# Patient Record
Sex: FEMALE | Race: BLACK OR AFRICAN AMERICAN
[De-identification: names, ages, dates, MRNs, and addresses within clinical notes are randomized per-mention and may not be internally consistent; named-entity substitution may affect disease eponyms.]

---

## 2018-08-20 ENCOUNTER — HOSPITAL ENCOUNTER (INPATIENT)
Dept: HOSPITAL 74 - JDEL | Age: 33
LOS: 3 days | Discharge: HOME | End: 2018-08-23
Attending: OBSTETRICS & GYNECOLOGY | Admitting: OBSTETRICS & GYNECOLOGY
Payer: COMMERCIAL

## 2018-08-20 VITALS — BODY MASS INDEX: 45.3 KG/M2

## 2018-08-20 DIAGNOSIS — M51.26: ICD-10-CM

## 2018-08-20 DIAGNOSIS — Z3A.39: ICD-10-CM

## 2018-08-20 DIAGNOSIS — O98.313: ICD-10-CM

## 2018-08-20 DIAGNOSIS — O99.213: ICD-10-CM

## 2018-08-20 DIAGNOSIS — E66.01: ICD-10-CM

## 2018-08-20 DIAGNOSIS — A60.09: ICD-10-CM

## 2018-08-20 LAB
ANION GAP SERPL CALC-SCNC: 8 MMOL/L (ref 8–16)
APTT BLD: 27.3 SECONDS (ref 25.2–36.5)
BASOPHILS # BLD: 0.3 % (ref 0–2)
BUN SERPL-MCNC: 8 MG/DL (ref 7–18)
CALCIUM SERPL-MCNC: 8.5 MG/DL (ref 8.5–10.1)
CHLORIDE SERPL-SCNC: 104 MMOL/L (ref 98–107)
CO2 SERPL-SCNC: 26 MMOL/L (ref 21–32)
CREAT SERPL-MCNC: 0.7 MG/DL (ref 0.55–1.02)
DEPRECATED RDW RBC AUTO: 14.3 % (ref 11.6–15.6)
EOSINOPHIL # BLD: 0.3 % (ref 0–4.5)
GLUCOSE SERPL-MCNC: 91 MG/DL (ref 74–106)
HCT VFR BLD CALC: 36.9 % (ref 32.4–45.2)
HGB BLD-MCNC: 12.5 GM/DL (ref 10.7–15.3)
INR BLD: 1.01 (ref 0.83–1.09)
LYMPHOCYTES # BLD: 11.1 % (ref 8–40)
MCH RBC QN AUTO: 30.5 PG (ref 25.7–33.7)
MCHC RBC AUTO-ENTMCNC: 33.9 G/DL (ref 32–36)
MCV RBC: 89.8 FL (ref 80–96)
MONOCYTES # BLD AUTO: 4.7 % (ref 3.8–10.2)
NEUTROPHILS # BLD: 83.6 % (ref 42.8–82.8)
PLATELET # BLD AUTO: 289 K/MM3 (ref 134–434)
PMV BLD: 7.4 FL (ref 7.5–11.1)
POTASSIUM SERPLBLD-SCNC: 3.8 MMOL/L (ref 3.5–5.1)
PT PNL PPP: 11.4 SEC (ref 9.7–13)
RBC # BLD AUTO: 4.11 M/MM3 (ref 3.6–5.2)
SODIUM SERPL-SCNC: 138 MMOL/L (ref 136–145)
WBC # BLD AUTO: 14.5 K/MM3 (ref 4–10)

## 2018-08-20 PROCEDURE — 0HQ9XZZ REPAIR PERINEUM SKIN, EXTERNAL APPROACH: ICD-10-PCS | Performed by: OBSTETRICS & GYNECOLOGY

## 2018-08-20 RX ADMIN — BUPIVACAINE HYDROCHLORIDE SCH ML: 7.5 INJECTION, SOLUTION EPIDURAL; RETROBULBAR at 20:45

## 2018-08-20 RX ADMIN — AMPICILLIN SCH MLS/HR: 1 INJECTION, POWDER, FOR SOLUTION INTRAMUSCULAR; INTRAVENOUS at 17:00

## 2018-08-20 RX ADMIN — AMPICILLIN SCH MLS/HR: 1 INJECTION, POWDER, FOR SOLUTION INTRAMUSCULAR; INTRAVENOUS at 21:00

## 2018-08-20 RX ADMIN — SODIUM CHLORIDE, SODIUM GLUCONATE, SODIUM ACETATE, POTASSIUM CHLORIDE, AND MAGNESIUM CHLORIDE SCH MLS/HR: 526; 502; 368; 37; 30 INJECTION, SOLUTION INTRAVENOUS at 19:45

## 2018-08-20 NOTE — PN
Progress Note, Labor


  ** Vaginal Exam #1


Labor Exam Date: 08/20/18


Labor Exam Time: 23:00


Fetal Heart Rate (range): 150' + accels, decel with exam


Dilatation: 4-5cm


Effacement (%): 90


Amniotic Membrane Status: Ruptured


Fetal Presentation: Vertex/Position


Fetal Station: -2


Remarks: 





AROM, IUPC placed


Category 2 FHR, reassuring


Start Pitocin


Pain controlled with epidural
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
## 2018-08-21 LAB
ARTERIAL BLOOD GAS PCO2: (no result) MMHG (ref 35–45)
BASE EXCESS BLDA CALC-SCNC: (no result) MEQ/L (ref -2–2)
PH BLDV: 7.3 [PH] (ref 7.32–7.42)
PO2 BLDA: (no result) MMHG (ref 80–100)
SAO2 % BLDA: (no result) % (ref 90–98.9)
VENOUS PC02: 40.4 MMHG (ref 38–52)
VENOUS PO2: 28.4 MMHG (ref 28–48)

## 2018-08-21 RX ADMIN — WITCH HAZEL PRN PAD: 500 SOLUTION RECTAL; TOPICAL at 22:14

## 2018-08-21 RX ADMIN — ACETAMINOPHEN PRN MG: 325 TABLET ORAL at 09:30

## 2018-08-21 RX ADMIN — AMPICILLIN SCH MLS/HR: 1 INJECTION, POWDER, FOR SOLUTION INTRAMUSCULAR; INTRAVENOUS at 01:00

## 2018-08-21 RX ADMIN — AMPICILLIN SCH MLS/HR: 1 INJECTION, POWDER, FOR SOLUTION INTRAMUSCULAR; INTRAVENOUS at 05:09

## 2018-08-21 RX ADMIN — FERROUS SULFATE TAB EC 324 MG (65 MG FE EQUIVALENT) SCH: 324 (65 FE) TABLET DELAYED RESPONSE at 10:00

## 2018-08-21 RX ADMIN — Medication SCH: at 11:30

## 2018-08-21 RX ADMIN — ACETAMINOPHEN PRN MG: 325 TABLET ORAL at 19:20

## 2018-08-21 RX ADMIN — BENZOCAINE PRN SPRAY: 11.4 AEROSOL, SPRAY TOPICAL at 22:13

## 2018-08-21 RX ADMIN — SODIUM CHLORIDE, SODIUM GLUCONATE, SODIUM ACETATE, POTASSIUM CHLORIDE, AND MAGNESIUM CHLORIDE SCH MLS/HR: 526; 502; 368; 37; 30 INJECTION, SOLUTION INTRAVENOUS at 01:46

## 2018-08-21 RX ADMIN — AMPICILLIN SCH: 1 INJECTION, POWDER, FOR SOLUTION INTRAMUSCULAR; INTRAVENOUS at 21:20

## 2018-08-21 RX ADMIN — IBUPROFEN PRN MG: 600 TABLET, FILM COATED ORAL at 19:12

## 2018-08-21 RX ADMIN — AMPICILLIN SCH: 1 INJECTION, POWDER, FOR SOLUTION INTRAMUSCULAR; INTRAVENOUS at 09:24

## 2018-08-21 RX ADMIN — FERROUS SULFATE TAB EC 324 MG (65 MG FE EQUIVALENT) SCH MG: 324 (65 FE) TABLET DELAYED RESPONSE at 22:13

## 2018-08-21 RX ADMIN — BUPIVACAINE HYDROCHLORIDE SCH: 7.5 INJECTION, SOLUTION EPIDURAL; RETROBULBAR at 21:18

## 2018-08-21 RX ADMIN — BENZOCAINE PRN SPRAY: 11.4 AEROSOL, SPRAY TOPICAL at 17:38

## 2018-08-21 NOTE — PN
Delivery





- Delivery


Vaginal Delivery: No Problems


Type of Anesthesia: Epidural


Episiotomy/Laceration: Midline, 1st degree


EBL (cc): 350





Delivery, Single Birth





- Stages of Labor


Date 1st Stage Initiatied: 18


Time 1st Stage Initiated: 04:00


Date 2nd Stage Initiated: 18


Time 2nd Stage Initiated: 07:10


Date of Delivery: 18


Time of Delivery: 08:46


Date Placenta Delivered: 18


Time Placenta Delivered: 08:53


Placenta: Yes: Spontaneous





- Condition of Infant


Pediatrician/Neonatologist Present: Yes


Infant Gender: Female


Position: Right, OP





- Apgar


  ** 1 Minute


Apgar Total Score: 8





  ** 5 Minutes


Apgar Total Score: 9





- [facility] Feeding Plan


Initial Plan: Exclusive breastfeeding throughout hospitalization


Benefits of Breastfeeding Exclusively reinforced: Yes





Remarks





- Remarks


Remarks: 





Uncomplicated delivery of head and shoulders

## 2018-08-21 NOTE — PN
Progress Note, Labor


  ** Vaginal Exam #2


Labor Exam Date: 08/21/18


Labor Exam Time: 03:00


Fetal Heart Rate (range): 150's


Dilatation: 6-7cm


Effacement (%): 90


Amniotic Membrane Status: Ruptured


Fetal Presentation: Vertex/Position


Fetal Station: -2 (Active labor Top off epidural requested EFW 7lb MF status 

reasuring cont. labor managment)

## 2018-08-22 LAB
BASOPHILS # BLD: 0.3 % (ref 0–2)
DEPRECATED RDW RBC AUTO: 14.5 % (ref 11.6–15.6)
EOSINOPHIL # BLD: 1.1 % (ref 0–4.5)
HCT VFR BLD CALC: 33.9 % (ref 32.4–45.2)
HGB BLD-MCNC: 11.2 GM/DL (ref 10.7–15.3)
LYMPHOCYTES # BLD: 15.8 % (ref 8–40)
MCH RBC QN AUTO: 29.6 PG (ref 25.7–33.7)
MCHC RBC AUTO-ENTMCNC: 33.1 G/DL (ref 32–36)
MCV RBC: 89.3 FL (ref 80–96)
MONOCYTES # BLD AUTO: 5.7 % (ref 3.8–10.2)
NEUTROPHILS # BLD: 77.1 % (ref 42.8–82.8)
PLATELET # BLD AUTO: 262 K/MM3 (ref 134–434)
PMV BLD: 7.4 FL (ref 7.5–11.1)
RBC # BLD AUTO: 3.8 M/MM3 (ref 3.6–5.2)
WBC # BLD AUTO: 17.9 K/MM3 (ref 4–10)

## 2018-08-22 RX ADMIN — Medication SCH TAB: at 09:28

## 2018-08-22 RX ADMIN — FERROUS SULFATE TAB EC 324 MG (65 MG FE EQUIVALENT) SCH MG: 324 (65 FE) TABLET DELAYED RESPONSE at 09:28

## 2018-08-22 RX ADMIN — ACETAMINOPHEN PRN MG: 325 TABLET ORAL at 15:21

## 2018-08-22 RX ADMIN — IBUPROFEN PRN MG: 600 TABLET, FILM COATED ORAL at 15:20

## 2018-08-22 RX ADMIN — FERROUS SULFATE TAB EC 324 MG (65 MG FE EQUIVALENT) SCH MG: 324 (65 FE) TABLET DELAYED RESPONSE at 21:26

## 2018-08-22 NOTE — PN
Progress Note (short form)





- Note


Progress Note: 





ppd 1 doing well, no c/o , voids ok


 CBC, BMP





 08/22/18 08:00 





 08/20/18 13:20 





 Last Vital Signs











Temp Pulse Resp BP Pulse Ox


 


 97.8 F   74   20   122/67   98 


 


 08/22/18 08:48  08/22/18 08:48  08/22/18 08:48  08/22/18 08:48  08/21/18 11:00








abdomen soft, uterus firm, non tender 


lochia mild 


no calf tenderness 


ppd1 , afebrile , doing well


plan ambulate , planfor d/c home in am

## 2018-08-23 VITALS — HEART RATE: 73 BPM | SYSTOLIC BLOOD PRESSURE: 141 MMHG | TEMPERATURE: 98 F | DIASTOLIC BLOOD PRESSURE: 87 MMHG

## 2018-08-23 RX ADMIN — Medication SCH TAB: at 09:08

## 2018-08-23 RX ADMIN — WITCH HAZEL PRN PAD: 500 SOLUTION RECTAL; TOPICAL at 02:04

## 2018-08-23 RX ADMIN — IBUPROFEN PRN MG: 600 TABLET, FILM COATED ORAL at 10:57

## 2018-08-23 RX ADMIN — IBUPROFEN PRN MG: 600 TABLET, FILM COATED ORAL at 02:04

## 2018-08-23 RX ADMIN — FERROUS SULFATE TAB EC 324 MG (65 MG FE EQUIVALENT) SCH MG: 324 (65 FE) TABLET DELAYED RESPONSE at 09:08

## 2018-08-23 RX ADMIN — ACETAMINOPHEN PRN MG: 325 TABLET ORAL at 10:58

## 2018-08-23 RX ADMIN — ACETAMINOPHEN PRN MG: 325 TABLET ORAL at 02:05

## 2018-08-23 NOTE — PN
Post Partum Progress Note





- Subjective


Subjective: 





Patient without acute complaints. 


Reports tolerating oral intake without nausea or vomiting. 


Ambulating without dizziness. 


Denies fevers or chills.


Pain well controlled with oral pain medication.


Breastfeeding without difficulty.


Passing flatus.


Post Partum Day: 2


Type of Delivery: 


Vital Signs: 


 Vital Signs











Temperature  97.9 F   18 21:00


 


Pulse Rate  70   18 21:00


 


Respiratory Rate  20   18 21:00


 


Blood Pressure  101/60   18 21:00


 


O2 Sat by Pulse Oximetry (%)  98   18 11:00











Breast Exam: Yes: Soft


Uterus: Yes: Fundus Firm, Fundus below umbilicus


Abdomen/GI: Yes: Abdomen soft, Passing flatus, Tolerating PO.  No: Abdominal 

Distention, Tender


Lochia: Yes: Serosa


Lochia, amount: Small


Extremities: Yes: Calves non-tender, Edema


Perineum: Yes: Laceration


Activity: Ambulating





- Labs


Labs: 


 CBC











WBC  17.9 K/mm3 (4.0-10.0)  H  18  08:00    


 


RBC  3.80 M/mm3 (3.60-5.2)   18  08:00    


 


Hgb  11.2 GM/dL (10.7-15.3)   18  08:00    


 


Hct  33.9 % (32.4-45.2)   18  08:00    


 


MCV  89.3 fl (80-96)   18  08:00    


 


MCH  29.6 pg (25.7-33.7)   18  08:00    


 


MCHC  33.1 g/dl (32.0-36.0)   18  08:00    


 


RDW  14.5 % (11.6-15.6)   18  08:00    


 


Plt Count  262 K/MM3 (134-434)   18  08:00    


 


MPV  7.4 fl (7.5-11.1)  L  18  08:00    


 


Absolute Neuts (auto)  13.8 K/mm3 (1.5-8.0)  H  18  08:00    


 


Neutrophils %  77.1 % (42.8-82.8)   18  08:00    


 


Lymphocytes %  15.8 % (8-40)  D 18  08:00    


 


Monocytes %  5.7 % (3.8-10.2)   18  08:00    


 


Eosinophils %  1.1 % (0-4.5)  D 18  08:00    


 


Basophils %  0.3 % (0-2.0)   18  08:00    


 


Nucleated RBC %  0 % (0-0)   18  08:00    














Assessment/Plan





32yo PPD # 2 s/p , afebrile, vital signs stable, stable for discharge home 

today 


1. Patient stable for discharge home today.


2. Patient encouraged to contact MD for:


- Severe pain not controlled by oral pain medication


- Fevers or chills


- Nausea or vomiting, intolerance of oral intake


3. Patient to follow up in office in 4-6 weeks for postpartum visit